# Patient Record
Sex: FEMALE | Race: WHITE | Employment: OTHER | URBAN - METROPOLITAN AREA
[De-identification: names, ages, dates, MRNs, and addresses within clinical notes are randomized per-mention and may not be internally consistent; named-entity substitution may affect disease eponyms.]

---

## 2019-11-24 ENCOUNTER — HOSPITAL ENCOUNTER (EMERGENCY)
Facility: CLINIC | Age: 84
Discharge: ANOTHER HOSPITAL | End: 2019-11-25
Attending: EMERGENCY MEDICINE | Admitting: EMERGENCY MEDICINE
Payer: MEDICARE

## 2019-11-24 ENCOUNTER — APPOINTMENT (OUTPATIENT)
Dept: CT IMAGING | Facility: CLINIC | Age: 84
End: 2019-11-24
Attending: EMERGENCY MEDICINE
Payer: MEDICARE

## 2019-11-24 VITALS
HEIGHT: 62 IN | RESPIRATION RATE: 16 BRPM | TEMPERATURE: 97.5 F | OXYGEN SATURATION: 95 % | BODY MASS INDEX: 20.94 KG/M2 | HEART RATE: 77 BPM | SYSTOLIC BLOOD PRESSURE: 169 MMHG | WEIGHT: 113.8 LBS | DIASTOLIC BLOOD PRESSURE: 87 MMHG

## 2019-11-24 DIAGNOSIS — H53.131 SUDDEN VISUAL LOSS OF RIGHT EYE: ICD-10-CM

## 2019-11-24 LAB
ANION GAP SERPL CALCULATED.3IONS-SCNC: 5 MMOL/L (ref 3–14)
BASOPHILS # BLD AUTO: 0 10E9/L (ref 0–0.2)
BASOPHILS NFR BLD AUTO: 0.7 %
BUN SERPL-MCNC: 22 MG/DL (ref 7–30)
CALCIUM SERPL-MCNC: 9.1 MG/DL (ref 8.5–10.1)
CHLORIDE SERPL-SCNC: 108 MMOL/L (ref 94–109)
CO2 SERPL-SCNC: 26 MMOL/L (ref 20–32)
CREAT SERPL-MCNC: 1.11 MG/DL (ref 0.52–1.04)
DIFFERENTIAL METHOD BLD: NORMAL
EOSINOPHIL # BLD AUTO: 0.3 10E9/L (ref 0–0.7)
EOSINOPHIL NFR BLD AUTO: 4.5 %
ERYTHROCYTE [DISTWIDTH] IN BLOOD BY AUTOMATED COUNT: 12.1 % (ref 10–15)
GFR SERPL CREATININE-BSD FRML MDRD: 46 ML/MIN/{1.73_M2}
GLUCOSE SERPL-MCNC: 115 MG/DL (ref 70–99)
HCT VFR BLD AUTO: 37.2 % (ref 35–47)
HGB BLD-MCNC: 11.9 G/DL (ref 11.7–15.7)
IMM GRANULOCYTES # BLD: 0 10E9/L (ref 0–0.4)
IMM GRANULOCYTES NFR BLD: 0.2 %
LYMPHOCYTES # BLD AUTO: 2.1 10E9/L (ref 0.8–5.3)
LYMPHOCYTES NFR BLD AUTO: 34.1 %
MCH RBC QN AUTO: 29.3 PG (ref 26.5–33)
MCHC RBC AUTO-ENTMCNC: 32 G/DL (ref 31.5–36.5)
MCV RBC AUTO: 92 FL (ref 78–100)
MONOCYTES # BLD AUTO: 0.6 10E9/L (ref 0–1.3)
MONOCYTES NFR BLD AUTO: 9.5 %
NEUTROPHILS # BLD AUTO: 3.1 10E9/L (ref 1.6–8.3)
NEUTROPHILS NFR BLD AUTO: 51 %
NRBC # BLD AUTO: 0 10*3/UL
NRBC BLD AUTO-RTO: 0 /100
PLATELET # BLD AUTO: 253 10E9/L (ref 150–450)
POTASSIUM SERPL-SCNC: 3.9 MMOL/L (ref 3.4–5.3)
RBC # BLD AUTO: 4.06 10E12/L (ref 3.8–5.2)
SODIUM SERPL-SCNC: 139 MMOL/L (ref 133–144)
WBC # BLD AUTO: 6 10E9/L (ref 4–11)

## 2019-11-24 PROCEDURE — 70450 CT HEAD/BRAIN W/O DYE: CPT

## 2019-11-24 PROCEDURE — 99285 EMERGENCY DEPT VISIT HI MDM: CPT | Mod: 25

## 2019-11-24 PROCEDURE — 25000125 ZZHC RX 250: Performed by: EMERGENCY MEDICINE

## 2019-11-24 PROCEDURE — 85025 COMPLETE CBC W/AUTO DIFF WBC: CPT | Performed by: EMERGENCY MEDICINE

## 2019-11-24 PROCEDURE — 25000128 H RX IP 250 OP 636: Performed by: EMERGENCY MEDICINE

## 2019-11-24 PROCEDURE — 70498 CT ANGIOGRAPHY NECK: CPT

## 2019-11-24 PROCEDURE — 85652 RBC SED RATE AUTOMATED: CPT | Performed by: EMERGENCY MEDICINE

## 2019-11-24 PROCEDURE — 80048 BASIC METABOLIC PNL TOTAL CA: CPT | Performed by: EMERGENCY MEDICINE

## 2019-11-24 RX ORDER — TROPICAMIDE 5 MG/ML
2 SOLUTION/ DROPS OPHTHALMIC ONCE
Status: COMPLETED | OUTPATIENT
Start: 2019-11-24 | End: 2019-11-24

## 2019-11-24 RX ORDER — IOPAMIDOL 755 MG/ML
70 INJECTION, SOLUTION INTRAVASCULAR ONCE
Status: COMPLETED | OUTPATIENT
Start: 2019-11-24 | End: 2019-11-24

## 2019-11-24 RX ORDER — PROPARACAINE HYDROCHLORIDE 5 MG/ML
2 SOLUTION/ DROPS OPHTHALMIC ONCE
Status: COMPLETED | OUTPATIENT
Start: 2019-11-24 | End: 2019-11-24

## 2019-11-24 RX ADMIN — FLUORESCEIN SODIUM 1 STRIP: 1 STRIP OPHTHALMIC at 21:49

## 2019-11-24 RX ADMIN — PROPARACAINE HYDROCHLORIDE 2 DROP: 5 SOLUTION/ DROPS OPHTHALMIC at 21:48

## 2019-11-24 RX ADMIN — TROPICAMIDE 2 DROP: 5 SOLUTION/ DROPS OPHTHALMIC at 22:02

## 2019-11-24 RX ADMIN — SODIUM CHLORIDE 90 ML: 9 INJECTION, SOLUTION INTRAVENOUS at 22:31

## 2019-11-24 RX ADMIN — IOPAMIDOL 70 ML: 755 INJECTION, SOLUTION INTRAVENOUS at 22:30

## 2019-11-24 ASSESSMENT — ENCOUNTER SYMPTOMS
HEADACHES: 0
PALPITATIONS: 1
EYE PAIN: 1
WEAKNESS: 0
NECK PAIN: 0

## 2019-11-24 ASSESSMENT — MIFFLIN-ST. JEOR: SCORE: 929.44

## 2019-11-24 NOTE — ED AVS SNAPSHOT
Emergency Department  [address] [place] [id number]  Phone:  [phone]  Fax:  [phone]                                    Cassandra Barry   MRN: [id number]    Department:   Emergency Department   Date of Visit:  11/24/2019           After Visit Summary Signature Page    I have received my discharge instructions, and my questions have been answered. I have discussed any challenges I see with this plan with the nurse or doctor.    ..........................................................................................................................................  Patient/Patient Representative Signature      ..........................................................................................................................................  Patient Representative Print Name and Relationship to Patient    ..................................................               ................................................  Date                                   Time    ..........................................................................................................................................  Reviewed by Signature/Title    ...................................................              ..............................................  Date                                               Time          22EPIC Rev 08/18

## 2019-11-25 ENCOUNTER — HOSPITAL ENCOUNTER (EMERGENCY)
Facility: CLINIC | Age: 84
Discharge: HOME | End: 2019-11-25
Attending: EMERGENCY MEDICINE | Admitting: EMERGENCY MEDICINE
Payer: MEDICARE

## 2019-11-25 VITALS
SYSTOLIC BLOOD PRESSURE: 184 MMHG | OXYGEN SATURATION: 92 % | DIASTOLIC BLOOD PRESSURE: 87 MMHG | HEART RATE: 62 BPM | TEMPERATURE: 97.6 F

## 2019-11-25 DIAGNOSIS — I10 BENIGN ESSENTIAL HYPERTENSION: ICD-10-CM

## 2019-11-25 DIAGNOSIS — H20.9 UVEITIS: ICD-10-CM

## 2019-11-25 LAB — ERYTHROCYTE [SEDIMENTATION RATE] IN BLOOD BY WESTERGREN METHOD: 21 MM/H (ref 0–30)

## 2019-11-25 PROCEDURE — 99284 EMERGENCY DEPT VISIT MOD MDM: CPT | Mod: 25 | Performed by: EMERGENCY MEDICINE

## 2019-11-25 PROCEDURE — 25000128 H RX IP 250 OP 636: Performed by: EMERGENCY MEDICINE

## 2019-11-25 PROCEDURE — 96374 THER/PROPH/DIAG INJ IV PUSH: CPT | Performed by: EMERGENCY MEDICINE

## 2019-11-25 PROCEDURE — 96376 TX/PRO/DX INJ SAME DRUG ADON: CPT | Performed by: EMERGENCY MEDICINE

## 2019-11-25 PROCEDURE — 99284 EMERGENCY DEPT VISIT MOD MDM: CPT | Mod: Z6 | Performed by: EMERGENCY MEDICINE

## 2019-11-25 RX ORDER — TRAZODONE HYDROCHLORIDE 50 MG/1
50 TABLET, FILM COATED ORAL AT BEDTIME
COMMUNITY

## 2019-11-25 RX ORDER — MINERAL OIL AND WHITE PETROLATUM 150; 830 MG/G; MG/G
OINTMENT OPHTHALMIC AT BEDTIME
COMMUNITY

## 2019-11-25 RX ORDER — LEVOTHYROXINE SODIUM 50 UG/1
50 TABLET ORAL DAILY
COMMUNITY

## 2019-11-25 RX ORDER — HYDROXYCHLOROQUINE SULFATE 200 MG/1
200 TABLET, FILM COATED ORAL DAILY
COMMUNITY

## 2019-11-25 RX ORDER — SIMVASTATIN 20 MG
20 TABLET ORAL AT BEDTIME
COMMUNITY

## 2019-11-25 RX ORDER — ACETAMINOPHEN 500 MG
1000 TABLET ORAL ONCE
COMMUNITY

## 2019-11-25 RX ORDER — OXYBUTYNIN CHLORIDE 5 MG/1
2.5 TABLET ORAL 2 TIMES DAILY
COMMUNITY

## 2019-11-25 RX ORDER — LISINOPRIL 10 MG/1
10 TABLET ORAL DAILY
COMMUNITY

## 2019-11-25 RX ORDER — OMEPRAZOLE 40 MG/1
40 CAPSULE, DELAYED RELEASE ORAL DAILY
COMMUNITY

## 2019-11-25 RX ORDER — IPRATROPIUM BROMIDE 42 UG/1
2 SPRAY, METERED NASAL 3 TIMES DAILY
COMMUNITY

## 2019-11-25 RX ORDER — ASPIRIN 81 MG/1
81 TABLET, CHEWABLE ORAL DAILY
COMMUNITY

## 2019-11-25 RX ORDER — LEVOTHYROXINE SODIUM 75 UG/1
75 TABLET ORAL DAILY
COMMUNITY

## 2019-11-25 RX ORDER — LABETALOL 20 MG/4 ML (5 MG/ML) INTRAVENOUS SYRINGE
10 ONCE
Status: COMPLETED | OUTPATIENT
Start: 2019-11-25 | End: 2019-11-25

## 2019-11-25 RX ORDER — PREDNISOLONE ACETATE 10 MG/ML
1-2 SUSPENSION/ DROPS OPHTHALMIC
Qty: 5 ML | Refills: 0 | Status: SHIPPED | OUTPATIENT
Start: 2019-11-25

## 2019-11-25 RX ORDER — ATROPINE SULFATE 10 MG/ML
1 SOLUTION/ DROPS OPHTHALMIC 2 TIMES DAILY
Qty: 2 ML | Refills: 0 | Status: SHIPPED | OUTPATIENT
Start: 2019-11-25

## 2019-11-25 RX ORDER — LABETALOL 20 MG/4 ML (5 MG/ML) INTRAVENOUS SYRINGE
5 ONCE
Status: COMPLETED | OUTPATIENT
Start: 2019-11-25 | End: 2019-11-25

## 2019-11-25 RX ADMIN — LABETALOL 20 MG/4 ML (5 MG/ML) INTRAVENOUS SYRINGE 5 MG: at 04:02

## 2019-11-25 RX ADMIN — LABETALOL 20 MG/4 ML (5 MG/ML) INTRAVENOUS SYRINGE 10 MG: at 04:38

## 2019-11-25 ASSESSMENT — ENCOUNTER SYMPTOMS
COUGH: 0
VOMITING: 0
FEVER: 0
SORE THROAT: 0
SHORTNESS OF BREATH: 0
NAUSEA: 0
DYSURIA: 0
EYE PAIN: 1

## 2019-11-25 NOTE — ED PROVIDER NOTES
"  History     Chief Complaint:  Eye Problem    HPI   Cassandra Barry is a 84 year old female with history of cataracts and stroke who presents with eye problem. The patient states an hour prior she developed sharp pain in her right eye. She states she put in eyedrops before dinner for cataracts. She reports 30 minutes prior she lost vision in her right eye, prompting today's visit. During evaluation, the patient admits to eye pain as well as palpitations. She denies head or neck pain as well as extremity weaknesses. She notes her eye pain alleviates when she keeps her eye closed.    Allergies:  No known drug allergies    Medications:    Simvastatin    Past Medical History:    Cataracts  HTN  HLD    Past Surgical History:    Cataract surgery    Family History:    History reviewed. No pertinent family history.     Social History:  Smoking status: No  Alcohol use: Yes  Drug use: No  The patient presents to the emergency department with son.    Review of Systems   Eyes: Positive for pain and visual disturbance.   Cardiovascular: Positive for palpitations.   Musculoskeletal: Negative for neck pain.   Neurological: Negative for weakness and headaches.   All other systems reviewed and are negative.    Physical Exam   Patient Vitals for the past 24 hrs:   BP Temp Temp src Pulse Heart Rate Resp SpO2 Height Weight   11/24/19 2330 (!) 169/87 -- -- 77 78 16 95 % -- --   11/24/19 2300 (!) 179/98 -- -- 80 77 10 96 % -- --   11/24/19 2205 -- -- -- -- 85 11 95 % -- --   11/24/19 2200 (!) 200/95 -- -- 87 90 21 95 % -- --   11/24/19 2150 -- -- -- -- -- -- 96 % -- --   11/24/19 2145 (!) 221/117 -- -- 95 -- -- 97 % -- --   11/24/19 2127 (!) 200/101 97.5  F (36.4  C) Oral 98 -- 14 97 % 1.575 m (5' 2\") 51.6 kg (113 lb 12.8 oz)     Physical Exam  Constitutional: Elderly white female supine  HENT: No signs of trauma.   Eyes: Left pupil 2 mm, right pupil 1 mm. Anterior chamber slightly cloudy. Fluorescein negative, no abrasions seen. " Tonometry pressure 21.   Neck: full rom;  No carotid bruits  no meningismus  Cardiovascular: Regular rhythm.  Exam reveals no gallop and no friction rub.    No murmur heard.  Pulmonary/Chest: Bilateral breath sounds normal. No wheezes, rhonchi or rales.  Abdominal: Soft. No tenderness. No rebound or guarding.   Musculoskeletal: No edema. No tenderness.   Lymphadenopathy: No lymphadenopathy.   Neurological: Alert and oriented to person, place, and time. Normal strength. Coordination normal. Fluent speech, no facial asymmetry, normal sensation.  Skin: Skin is warm and dry. No rash noted. No erythema.     Emergency Department Course   Imaging:  Radiographic findings were communicated with the patient who voiced understanding of the findings.    Head CT wo Contrast  IMPRESSION:   HEAD CT:  1.  No CT evidence of acute intracranial hemorrhage or recent infarct.  2.  Approximately 5 x 15 mm area of dural-based stippled calcifications in the left parietal region potentially reflecting a partially calcified meningioma. No significant associated mass effect.  3.  Chronic-appearing lacunar infarct of medial right thalamus.  4.  Mild age-related changes.     As read by Radiology.    CTA Head Neck w Contrast  HEAD CTA:   1.  Normal CTA [place] [facility] [place].     NECK CTA:  1.  Normal neck CTA.    As read by Radiology.    Laboratory:  CBC: WBC 6.0, HGB 11.9,   BMP: Glucose 115, creatinine 1.11, GFR 46  Sed rate 21    Interventions:  2148 Alcaine 2 drops right eye  2149 Ful-kathy 1 strip right eye  2202 Mydriacyl 2 drops right eye    Emergency Department Course:  Past medical records, nursing notes, and vitals reviewed.  2135: I performed an exam of the patient and obtained history, as documented above.    IV inserted and blood drawn.    The patient was sent for a head and neck CT while in the emergency department, findings above.    Findings and plan explained to the Patient.    0000: Patient will be transferred to [facility] ED via  private vehicle. Discussed the case with on call opthamologist,     Impression & Plan    Medical Decision Making:  Cassandra Barry is a 84 year old female up here from [place]. About 30 minutes before coming to the ED, she developed sudden loss of vision in her left eye. She has never experienced this before and it is also somewhat uncomfortable. She has had bilateral cataracts removed and she wears bifocals. She has had some high blood pressure issues and had a small stroke which has completely resolved. She denies any other findings such as numbness and weakness in her extremities. She denies any bladder or bowel incontinence, fevers, and chills. On exam, her pupils are very small, minimally reactive. I placed Alcaine in her right eye, checked her pressure which averaged 21, and also used fluorescein looking for abrasions, none were seen. The anterior chamber seemed slightly cloudy compared to the left. Patient had blood work done. On the monitor she is in a sinus rhythm and CT, CTA showed no acute findings. Patient was only able to see light and dark and could not count fingers for me. I started a dilation of the right eye with mydriacyl so I could do a fundoscopic exam. We were able to dilate to mid position but I will still unable to get a good fundoscopic exam. There was diffuse redness but I was unable to appreciate vessels, disc or cup. I consulted ophthalmology on call and spoke with  On call  opthamology. Chief concern was this could be  central retinal artery occlusion. However, without being able to see the fundus well, this could not be confirmed. Recommendation was to have the patient transferred to the [facility] ED so that she could have direct visual exam with dilatation. I discussed this with patient and her son and they agreed to go there. They preferred to drive rather than take the ambulance. When the patient left, she was starting to get some vision back in the right eye. When she  looked at the TV she was starting to make out shapes which she could not do before. This is hopeful.      Diagnosis:    ICD-10-CM   1. Sudden visual loss of right eye H53.131     Disposition:  Transferred to [facility] ED.                Scribe Disclosure:  I, Kendra Galindo, am serving as a scribe at 9:35 PM on 11/24/2019 to document services personally performed by Lionel Gomez MD based on my observations and the provider's statements to me.    [facility] EMERGENCY DEPARTMENT         Lionel Gomez MD  11/25/19 0157

## 2019-11-25 NOTE — ED TRIAGE NOTES
Pt comes from [facility] upon recommendation from physicians with complete vision loss in the right eye. Upon arrival patient states that she is starting to have return of some vision, shapes and colors, but still very blurry and still has irritation and pain in that eye. Denies headache, dizziness, chest pain, n/v, and sob. Patient is hypertensive upon arrival at 224/122.

## 2019-11-25 NOTE — CONSULTS
OPHTHALMOLOGY CONSULT NOTE  11/25/19    Patient: Cassandra Barry  Consulted by: ED Staff  Reason for Consult: Sudden onset painful vision loss right eye    HISTORY OF PRESENTING ILLNESS:     Cassandra Barry is a 84 year old female with a history of CVA without residual deficits, HTN, HLD, RA on plaquenil, and dry eye presenting with sudden painful vision loss around 900 pm on 11/24/2019.  The right eye had sharp pain beginning around 9 pm.  She has dry eye and she thought she needed to put in eye drops.  The artificial tears didn't help, and she notes she couldn't see clearly out of the eye at all when she tried to look in the mirror.  She was able to make out shapes but nothing more out of the right eye.  The left eye was unaffected.  She had strong light sensitivity and notes it felt better to keep the eye close.  Her acute vision loss was concerning enough to go to the ER.  Thus, she sought care at [facility] ED.  There was initial concern for stroke but CTA head and neck was negative for acute abnormality or carotid stenosis.  She was transferred to [facility] out of concern for retinal artery/vein occlusion.  Since arriving to the ED here, her vision has improved.  She was initially CF at 5' but improved to 20/40 with eccentric viewing on near card within several additional minutes.  The eye remains achy, and she has aching in the right eye when light is exposed to the left eye.    She has had episodes in the past where things have been blurry, but never anything quite as profound.  No flashes/floaters.        Patient had a stroke around 12 years ago.  No residual deficits/weakness.  No MI's, no stents.  No history of afib.  She has new HA's over the last 2 weeks in the back of the head.  No recent weightloss.  No jaw claudication.  No new proximal muscle pain/weakness.  No recent trauma.            Review of systems were otherwise negative except for that which has been stated above.      OCULAR/MEDICAL/SURGICAL  HISTORIES:     Past Ocular History:  Blepharoplasty both eyes  CE/IOL ~ 2005/2009  Long-term plaquenil usage 200 mg daily for 3-4 years   - took for about 5 years before that  No glaucoma    Current Eye Meds:   FML both eyes 1 drop in the morning  Refresh tears both eyes 1 drop 3 - 4 times as needed  Flaxseed Oil 1,000 mg 2 times daily  Refresh PM both eyes at bedtime       Past Medical History:   Diagnosis Date     Hypertension      Nonsenile cataract      Thyroid disease        Past Surgical History:   Procedure Laterality Date     COLON SURGERY       ENT SURGERY       EYE SURGERY       HERNIA REPAIR         EXAMINATION:     Base Eye Exam     Visual Acuity (Snellen - Linear)       Right Left    Near cc 20/40 20/25          Tonometry (Tonopen, 2:28 AM)       Right Left    Pressure 14 13          Pupils       Pupils Dark Light Shape React APD    Right PERRL 5 3 Round Brisk None    Left PERRL 5 3 Round Brisk None          Visual Fields       Left Right     Full Full          Extraocular Movement       Right Left     Full, Ortho Full, Ortho          Neuro/Psych     Oriented x3:  Yes    Mood/Affect:  Normal          Dilation     Both eyes:  1.0% Mydriacyl, 2.5% [place] Synephrine @ 2:29 AM            Additional Tests     Color       Right Left    Ishihara 11/11 11/11            Slit Lamp and Fundus Exam     External Exam       Right Left    External Normal Normal          Slit Lamp Exam       Right Left    Lids/Lashes RUL mild erythema CRISTEL mild erythema    Conjunctiva/Sclera trace diffuse injection White and quiet    Cornea Diffuse PEE's, confluent in central pupillary axis, mucoid tear film debris Mild PEE's, mucoid tear film debris    Anterior Chamber 3+ cell/flare Deep and quiet    Iris Round and reactive, no synechiae Round and reactive    Lens PCIOL in good position PCIOL    Vitreous PVD PVD          Fundus Exam       Right Left    Disc Normal, hazy Normal    C/D Ratio 0.2 0.2    Macula Normal, hazy Normal    Vessels  Normal, hazy Normal    Periphery Normal, hazy Normal                Labs/Studies/Imaging Performed    CTA Head/neck:  IMPRESSION:   HEAD CT:  1.  No CT evidence of acute intracranial hemorrhage or recent infarct.  2.  Approximately 5 x 15 mm area of dural-based stippled calcifications in the left parietal region potentially reflecting a partially calcified meningioma. No significant associated mass effect.  3.  Chronic-appearing lacunar infarct of medial right thalamus.  4.  Mild age-related changes.     HEAD CTA:   1.  Normal CTA [place] [facility] [place].     NECK CTA:  1.  Normal neck CTA.       ASSESSMENT/PLAN:     Cassandra Barry is a 84 year old female with a history of CVA without residual deficits, HTN, HLD, RA on plaquenil, single flare of SLE 20 years ago, and dry eye presenting with anterior uveitis of the right eye, first known episode.  Her vision has improved in the ED to 20/40 in the right eye on near card.  Exam shows 3+ cell/flare.  DFE within normal limits, no evidence of retinal vascular occlusion.  Patient plans to return to [place] tomorrow.      #Anterior uveitis, right eye  - Begin pred forte q2 hours while awake in the right eye  - Begin atropine BID in the right eye  - Continue restasis/lubrifresh/artificial tears in both eyes  - Follow up with local eye MD on Tuesday in [place] to monitor progress    It is our pleasure to participate in this patient's care and treatment. Please contact us with any further questions or concerns.      Ar Gilbert MD  PGY2, Ophthalmology

## 2019-11-25 NOTE — ED TRIAGE NOTES
Patient states started having pain in right eye an hour ago.  Then lost vision in her eye about 30 min ago.  Hx of cataracts.  Does not have pain in her eye as long she as has her eye closed.

## 2019-11-25 NOTE — DISCHARGE INSTRUCTIONS
Follow up with your eye doctor Tuesday for ANTERIOR UVEITIS. Use the medications as directed. If you are still in town on Tuesday, you may see the eye doctor here.     As discussed, your blood pressure is very high. You've declined recheck here. Please take your blood pressure med when you get home and have your blood pressure checked this morning. Return if it is not improving, or with any other concerns.     Please make an appointment to follow up with Eye Clinic (phone: [phone]).    You also have a likely benign (non-hazardous) mass noted on the edge of your brain. Discuss with your clinic doctor.

## 2019-11-25 NOTE — ED PROVIDER NOTES
"    [place] EMERGENCY DEPARTMENT ([facility])  11/25/19   History     Chief Complaint   Patient presents with     Loss of Vision     HPI  Cassandra Barry is a 84 year old female was transferred from [facility] for urgent ophthalmology evaluation with concern for possibility of central retinal artery occlusion.  From their notes, \"84 year old female with history of cataracts who presents with eye problem. The patient states an hour prior she developed sharp pain in her right eye. She states she put in eyedrops before dinner for cataracts. She reports 30 minutes prior she lost vision in her right eye, prompting today's visit. During evaluation, the patient admits to eye pain as well as palpitations. She denies head or neck pain as well as extremity weaknesses. She notes her eye pain alleviates when she keeps her eye closed.\"    The patient reports that she had had sharp pains in the eye was only seeing white at the time she lost her vision.  However, she states just recently she feels that the vision is starting to come back, and the pain is improving.  She states that she is now seeing colors and shapes.  She states that her vision is definitely not normal, but greatly improved compared with previous.  She states that the pain is also improving.  She denies any other complaints such as fever, sore throat, chest pain, shortness of breath, nausea, vomiting. She does report a history of high blood pressure but states that she did take all of her medications today including her evening blood pressure medications. No other acute complaints.    A head CT as well as a CTA head and neck was done at the outside facility which did not show acute intracranial pathology or vascular abnormality.  It did show an approximately 5 x 15 mm area of dural base stippled calcifications in the left parietal region potentially reflecting a partially calcified meningioma, without significant mass-effect.  There is also " chronic appearing lacunar infarct in the medial right thalamus.    This part of the medical record was transcribed by Rocky Tavera Medical Scribe.     I have reviewed the Medications, Allergies, Past Medical and Surgical History, and Social History in the [facility] system.    Past Medical History:   Diagnosis Date     Hypertension      Nonsenile cataract      Thyroid disease        Past Surgical History:   Procedure Laterality Date     COLON SURGERY       ENT SURGERY       EYE SURGERY       HERNIA REPAIR         History reviewed. No pertinent family history.    Social History     Tobacco Use     Smoking status: Former Smoker     Smokeless tobacco: Never Used     Tobacco comment: stopped 55yrs ago   Substance Use Topics     Alcohol use: Yes     Alcohol/week: 3.0 standard drinks     Types: 3 Glasses of wine per week     Comment:         No current facility-administered medications for this encounter.      Current Outpatient Medications   Medication     acetaminophen (TYLENOL) 500 MG tablet     aspirin (ASA) 81 MG chewable tablet     atropine 1 % ophthalmic solution     calcium-vitamin D 500-125 MG-UNIT TABS     hydroxychloroquine (PLAQUENIL) 200 MG tablet     ipratropium (ATROVENT) 0.06 % nasal spray     levothyroxine (SYNTHROID/LEVOTHROID) 50 MCG tablet     levothyroxine (SYNTHROID/LEVOTHROID) 75 MCG tablet     lisinopril (PRINIVIL/ZESTRIL) 10 MG tablet     omeprazole (PRILOSEC) 40 MG DR capsule     oxybutynin (DITROPAN) 5 MG tablet     prednisoLONE acetate (PRED FORTE) 1 % ophthalmic suspension     simvastatin (ZOCOR) 20 MG tablet     traZODone (DESYREL) 50 MG tablet     White Petrolatum-Mineral Oil (LUBRIFRESH P.M.) OINT      No Known Allergies     Review of Systems   Constitutional: Negative for fever.   HENT: Negative for sore throat.    Eyes: Positive for pain and visual disturbance.   Respiratory: Negative for cough and shortness of breath.    Gastrointestinal: Negative for nausea and vomiting.   Genitourinary:  Negative for dysuria.   All other systems reviewed and are negative.      Physical Exam   BP: (!) 224/122  Pulse: 86  Temp: 97.6  F (36.4  C)  SpO2: 98 %      Physical Exam  Constitutional:       General: She is not in acute distress.     Appearance: She is not diaphoretic.   HENT:      Head: Atraumatic.      Mouth/Throat:      Pharynx: No oropharyngeal exudate.   Eyes:      General: No scleral icterus.     Pupils: Pupils are equal, round, and reactive to light.   Cardiovascular:      Heart sounds: Normal heart sounds.   Pulmonary:      Effort: No respiratory distress.      Breath sounds: Normal breath sounds.   Abdominal:      Palpations: Abdomen is soft.      Tenderness: There is no abdominal tenderness.   Musculoskeletal:         General: No tenderness.   Skin:     General: Skin is warm.      Findings: No rash.         ED Course        Procedures             Critical Care time:  none             Labs Ordered and Resulted from Time of ED Arrival Up to the Time of Departure from the ED - No data to display         Assessments & Plan (with Medical Decision Making)   Thankfully the patient reported that her vision was starting to return upon arrival here.  The ophthalmologist to come and see her, and actually feels that she is got an anterior uveitis, rather than any vascular compromise.  He did recommend Pred forte and atropine, which were prescribed.  She will need to see the ophthalmologist tomorrow, either here or at home in [place].    There was an incidental finding on her brain imaging, likely meningioma.  I did discuss this with her, including the likely benign finding of this, but did recommend she follow-up with her primary care provider.    Of note, her blood pressure is quite high here, at times over 200 systolic.  It did come down some on its own, but then did go back up.  She states she did take her blood pressure meds within the last day.  She was given a small dose of labetalol, did come down some.  She  tells me that she is confident is high because she is anxious about her condition.  I did recommend admission for blood pressure management, given the fact that it still remains fairly high, but she is declined.  I did recommend giving her her normal morning dose of lisinopril before she leaves, but she states she has it at the hotel, would rather take it there.  She states that her blood pressures always really good when she has it checked in clinic, and is actually oftentimes fairly low.  I did strongly recommend she be seen somewhere today for repeat blood pressure check.  She is planning to go home to [place] later today.  She did verbalize understanding and is agreeable she is encouraged to return with concerns.    Dictation Disclaimer: Some of this Note has been completed with voice-recognition dictation software. Although errors are generally corrected real-time, there is the potential for a rare error to be present in the completed chart.      I have reviewed the nursing notes.    I have reviewed the findings, diagnosis, plan and need for follow up with the patient.    Discharge Medication List as of 11/25/2019  5:18 AM      START taking these medications    Details   atropine 1 % ophthalmic solution Place 1 drop into the right eye 2 times daily, Disp-2 mL, R-0, Local Print      prednisoLONE acetate (PRED FORTE) 1 % ophthalmic suspension Place 1-2 drops into the right eye every 2 hours (while awake), Disp-5 mL, R-0, Local Print             Final diagnoses:   Uveitis   Benign essential hypertension       11/25/2019   [facility], [place], EMERGENCY DEPARTMENT     Minoo Kapoor MD  11/25/19 0543       Minoo Kapoor MD  11/25/19 0543

## 2019-11-25 NOTE — ED AVS SNAPSHOT
[facility], [place], Emergency Department  [address] [place] [id number]  Phone:  [phone]                                    Cassandra Barry   MRN: [id number]    Department:  [facility], Emergency Department   Date of Visit:  11/25/2019           After Visit Summary Signature Page    I have received my discharge instructions, and my questions have been answered. I have discussed any challenges I see with this plan with the nurse or doctor.    ..........................................................................................................................................  Patient/Patient Representative Signature      ..........................................................................................................................................  Patient Representative Print Name and Relationship to Patient    ..................................................               ................................................  Date                                   Time    ..........................................................................................................................................  Reviewed by Signature/Title    ...................................................              ..............................................  Date                                               Time          22EPIC Rev 08/18